# Patient Record
Sex: FEMALE | Race: WHITE
[De-identification: names, ages, dates, MRNs, and addresses within clinical notes are randomized per-mention and may not be internally consistent; named-entity substitution may affect disease eponyms.]

---

## 2020-01-03 ENCOUNTER — HOSPITAL ENCOUNTER (EMERGENCY)
Dept: HOSPITAL 35 - ER | Age: 70
Discharge: LEFT BEFORE DISCHARGE | End: 2020-01-03
Payer: COMMERCIAL

## 2020-01-03 VITALS — BODY MASS INDEX: 26.81 KG/M2 | WEIGHT: 181 LBS | HEIGHT: 69 IN

## 2020-01-03 VITALS — DIASTOLIC BLOOD PRESSURE: 51 MMHG | SYSTOLIC BLOOD PRESSURE: 117 MMHG

## 2020-01-03 DIAGNOSIS — Z88.6: ICD-10-CM

## 2020-01-03 DIAGNOSIS — Z87.891: ICD-10-CM

## 2020-01-03 DIAGNOSIS — E11.9: ICD-10-CM

## 2020-01-03 DIAGNOSIS — R07.9: ICD-10-CM

## 2020-01-03 DIAGNOSIS — I10: Primary | ICD-10-CM

## 2020-01-03 DIAGNOSIS — E78.00: ICD-10-CM

## 2020-01-03 LAB
ALBUMIN SERPL-MCNC: 4.1 G/DL (ref 3.4–5)
ALT SERPL-CCNC: 27 U/L (ref 30–65)
ANION GAP SERPL CALC-SCNC: 8 MMOL/L (ref 7–16)
APTT BLD: 27.8 SECONDS (ref 24.5–32.8)
AST SERPL-CCNC: 20 U/L (ref 15–37)
BASOPHILS NFR BLD AUTO: 1.3 % (ref 0–2)
BILIRUB DIRECT SERPL-MCNC: 0.2 MG/DL
BILIRUB SERPL-MCNC: 0.5 MG/DL
BUN SERPL-MCNC: 17 MG/DL (ref 7–18)
CALCIUM SERPL-MCNC: 9.9 MG/DL (ref 8.5–10.1)
CHLORIDE SERPL-SCNC: 104 MMOL/L (ref 98–107)
CO2 SERPL-SCNC: 29 MMOL/L (ref 21–32)
CREAT SERPL-MCNC: 0.9 MG/DL (ref 0.6–1)
EOSINOPHIL NFR BLD: 2.5 % (ref 0–3)
ERYTHROCYTE [DISTWIDTH] IN BLOOD BY AUTOMATED COUNT: 13.3 % (ref 10.5–14.5)
GLUCOSE SERPL-MCNC: 82 MG/DL (ref 74–106)
GRANULOCYTES NFR BLD MANUAL: 56 % (ref 36–66)
HCT VFR BLD CALC: 42.5 % (ref 37–47)
HGB BLD-MCNC: 14.1 GM/DL (ref 12–15)
INR PPP: 1.1
LYMPHOCYTES NFR BLD AUTO: 32.9 % (ref 24–44)
MAGNESIUM SERPL-MCNC: 2.3 MG/DL (ref 1.8–2.4)
MCH RBC QN AUTO: 31 PG (ref 26–34)
MCHC RBC AUTO-ENTMCNC: 33.1 G/DL (ref 28–37)
MCV RBC: 93.8 FL (ref 80–100)
MONOCYTES NFR BLD: 7.3 % (ref 1–8)
NEUTROPHILS # BLD: 5.5 THOU/UL (ref 1.4–8.2)
PLATELET # BLD: 295 THOU/UL (ref 150–400)
POTASSIUM SERPL-SCNC: 3.8 MMOL/L (ref 3.5–5.1)
PROT SERPL-MCNC: 7.7 G/DL (ref 6.4–8.2)
PROTHROMBIN TIME: 11.1 SECONDS (ref 9.3–11.4)
RBC # BLD AUTO: 4.54 MIL/UL (ref 4.2–5)
SODIUM SERPL-SCNC: 141 MMOL/L (ref 136–145)
TROPONIN I SERPL-MCNC: <0.06 NG/ML (ref ?–0.06)
WBC # BLD AUTO: 9.8 THOU/UL (ref 4–11)

## 2020-01-04 NOTE — EKG
[place] [address] [facility] [employer]
[place]  [address]
Phone:  [phone]                    ELECTROCARDIOGRAM REPORT      
_______________________________________________________________________________
 
Name:       TILA ERICKSON              Room #:                     [facility]#:      [id number]     Account #:      [id number]  
Admission:  20    Attend Phys:                          
Discharge:  20    Date of Birth:  [birth year]  
                                                          Report #: [id number]
   [id number]
_______________________________________________________________________________
THIS REPORT FOR:   //name//                          
 
                         [facility] ED
                                       
Test Date:    2020               Test Time:    17:18:06
Pat Name:     TILA ERICKSON           Department:   
Patient ID:   [id number]            Room:          
Gender:       F                        Technician:   BLADIMIR
:          1950               Requested By: Surendra Frances
Order Number: [id number] MD:   Cooper Rosenthal
                                 Measurements
Intervals                              Axis          
Rate:         70                       P:            52
LA:           181                      QRS:          60
QRSD:         102                      T:            21
QT:           422                                    
QTc:          456                                    
                           Interpretive Statements
Sinus rhythm
Nonspecific T wave abnormality
Compared to ECG 2013 10:48:08
No significant change was found
Electronically Signed On 2020 11:51:24 CST by Cooper Rosenthal
[email or web address]
 
 
 
 
 
 
 
 
 
 
 
 
 
 
 
 
 
 
 
  <ELECTRONICALLY SIGNED>
   By: Cooper Rosenthal MD, [facility]   
  20     1151
D: 20 1718                           _____________________________________
T: 20 1718                           Cooper Rosenthal MD, FACC     /EPI

## 2021-12-02 ENCOUNTER — HOSPITAL ENCOUNTER (OUTPATIENT)
Dept: HOSPITAL 96 - M.CL | Age: 71
Discharge: HOME | End: 2021-12-02
Attending: INTERNAL MEDICINE
Payer: COMMERCIAL

## 2021-12-02 VITALS — SYSTOLIC BLOOD PRESSURE: 131 MMHG | DIASTOLIC BLOOD PRESSURE: 93 MMHG

## 2021-12-02 VITALS — SYSTOLIC BLOOD PRESSURE: 134 MMHG | DIASTOLIC BLOOD PRESSURE: 101 MMHG

## 2021-12-02 VITALS — DIASTOLIC BLOOD PRESSURE: 56 MMHG | SYSTOLIC BLOOD PRESSURE: 148 MMHG

## 2021-12-02 VITALS — DIASTOLIC BLOOD PRESSURE: 85 MMHG | SYSTOLIC BLOOD PRESSURE: 126 MMHG

## 2021-12-02 VITALS — SYSTOLIC BLOOD PRESSURE: 127 MMHG | DIASTOLIC BLOOD PRESSURE: 49 MMHG

## 2021-12-02 VITALS — SYSTOLIC BLOOD PRESSURE: 111 MMHG | DIASTOLIC BLOOD PRESSURE: 71 MMHG

## 2021-12-02 VITALS — DIASTOLIC BLOOD PRESSURE: 60 MMHG | SYSTOLIC BLOOD PRESSURE: 137 MMHG

## 2021-12-02 VITALS — WEIGHT: 181.37 LBS | HEIGHT: 69 IN | BODY MASS INDEX: 26.86 KG/M2

## 2021-12-02 VITALS — DIASTOLIC BLOOD PRESSURE: 52 MMHG | SYSTOLIC BLOOD PRESSURE: 128 MMHG

## 2021-12-02 DIAGNOSIS — I25.10: ICD-10-CM

## 2021-12-02 DIAGNOSIS — M19.90: ICD-10-CM

## 2021-12-02 DIAGNOSIS — I25.82: ICD-10-CM

## 2021-12-02 DIAGNOSIS — Z88.8: ICD-10-CM

## 2021-12-02 DIAGNOSIS — Z98.890: ICD-10-CM

## 2021-12-02 DIAGNOSIS — E11.9: ICD-10-CM

## 2021-12-02 DIAGNOSIS — Z79.899: ICD-10-CM

## 2021-12-02 DIAGNOSIS — R07.9: Primary | ICD-10-CM

## 2021-12-02 DIAGNOSIS — E78.5: ICD-10-CM

## 2021-12-02 LAB
ALBUMIN SERPL-MCNC: 3.7 G/DL (ref 3.4–5)
ALP SERPL-CCNC: 71 U/L (ref 46–116)
ALT SERPL-CCNC: 35 U/L (ref 30–65)
ANION GAP SERPL CALC-SCNC: 6 MMOL/L (ref 7–16)
APTT BLD: 26.6 SECONDS (ref 25–31.3)
AST SERPL-CCNC: 21 U/L (ref 15–37)
BILIRUB SERPL-MCNC: 0.4 MG/DL
BUN SERPL-MCNC: 14 MG/DL (ref 7–18)
CALCIUM SERPL-MCNC: 9.4 MG/DL (ref 8.5–10.1)
CHLORIDE SERPL-SCNC: 102 MMOL/L (ref 98–107)
CHOLEST SERPL-MCNC: 216 MG/DL (ref ?–200)
CO2 SERPL-SCNC: 31 MMOL/L (ref 21–32)
CREAT SERPL-MCNC: 0.7 MG/DL (ref 0.6–1.3)
GLUCOSE SERPL-MCNC: 108 MG/DL (ref 70–99)
HCT VFR BLD CALC: 38.7 % (ref 37–47)
HDLC SERPL-MCNC: 41 MG/DL (ref 40–?)
HGB BLD-MCNC: 13.2 GM/DL (ref 12–15)
INR PPP: 1
LDLC SERPL-MCNC: 130 MG/DL (ref ?–100)
MCH RBC QN AUTO: 31.1 PG (ref 26–34)
MCHC RBC AUTO-ENTMCNC: 34.1 G/DL (ref 28–37)
MCV RBC: 91.3 FL (ref 80–100)
MPV: 8.6 FL. (ref 7.2–11.1)
PLATELET COUNT*: 253 THOU/UL (ref 150–400)
POTASSIUM SERPL-SCNC: 4.1 MMOL/L (ref 3.5–5.1)
PROT SERPL-MCNC: 7.4 G/DL (ref 6.4–8.2)
PROTHROMBIN TIME: 10.3 SECONDS (ref 9.2–11.5)
RBC # BLD AUTO: 4.24 MIL/UL (ref 4.2–5)
RDW-CV: 13.8 % (ref 10.5–14.5)
SODIUM SERPL-SCNC: 139 MMOL/L (ref 136–145)
TC:HDL: 5.3 RATIO
TRIGL SERPL-MCNC: 225 MG/DL (ref ?–150)
VLDLC SERPL CALC-MCNC: 45 MG/DL (ref ?–40)
WBC # BLD AUTO: 9.9 THOU/UL (ref 4–11)

## 2021-12-02 NOTE — CARD
[address]  [address]                    CARDIAC CATH REPORT           
_______________________________________________________________________________
 
Name:       TILA ERICKSON               Room:                      REG CLI 
M.R.#:  [id number]      Account #:      [id number]  
Admission:  12/02/21     Attend Phys:    Emre Mcdonald MD, 
Discharge:               Date of Birth:  [birth year]  
         Report #: [id number]
                                                                     [id number]
_______________________________________________________________________________
THIS REPORT FOR:  
 
cc:  Surendra Riley MD, Michael D. MD [place], Michael J. MD [facility]                                         ~
 
 
--------------- APPROVED REPORT --------------
 
 
Study performed:  12/02/2021 11:53:01
 
Patient Details
Patient Status: Out-Patient                  Room #: 
The patient is a 71 year-old female
 
Event Personnel
Surendra Easley  Cardiologist, Toma Melton RN RN, Tona Cueva RN 
Monitor, Jacobo Segal RTR Scrub
 
Procedures Performed
Art Access - R femoral artery*  Left Heart Cath w/or w/o Coronaries 
[id number] [facility] Hemostasis w/ Angioseal
 
Admission/Lab Medications/Medications given during 
procedure
Lidocaine Subcut 10 ml, Midazolam (Versed) IV 1 mg, Fentanyl IV 25 
mcg
 
Procedure Narrative
The patient was brought electively to the Cardiac Catheterization 
Laboratory and was prepped and draped in a sterile manner. The right 
femoral was infiltrated with 2% Lidocaine subcutaneous anesthesia. IV 
conscious sedation was used throughout procedure with appropriate 
monitoring and was performed in the presence of a registered nurse 
who was an independent trained observer other than the physician 
performing the procedure. A Waterflow 6 FR sheath was inserted into 
the right femoral artery. Coronary angiography was performed using 
coronary diagnostic catheters. The right coronary system was accessed 
and visualized with a Diagnostic JR4 6Fr catheter. The left coronary 
system was accessed and visualized with a Diagnostic JL4 6Fr 
catheter. The left ventricle was accessed and visualized with a 
Diagnostic Pigtail 6Fr catheter. Left ventricular/Aortic Valve 
gradient assessed via catheter pullback. Left ventriculogram was 
performed in [place] projection. Pre-demployment femoral angiogram was 
performed . Closure device was deployed with a 6 Fr Mynx. The patient 
 
 
 
[place] [address]                    CARDIAC CATH REPORT           
_______________________________________________________________________________
 
Name:       TILA ERICKSON               Room:                      REG CLI 
M.R.#:  [id number]      Account #:      [id number]  
Admission:  12/02/21     Attend Phys:    Emre Mcdonald MD, 
Discharge:               Date of Birth:  [birth year]  
         Report #: [id number]
                                                                     [id number]
_______________________________________________________________________________
 
tolerated the procedure well and there were no complications 
associated with the procedure. There was no hematoma.
 
Intraoperative Conscious Sedation
Sedation start time:  1221           Case end Time:  
1236    
Fentanyl  25 mcg    Versed  1 mg  
 
Fluoro Time:    1.8 minutes    
Dose:     DAP 32470 cGycm2  798 mGy  
Contrast Type and Amount:  Omnipaque 90 mL    
 
Diagnostic Cath
Left Main The left main coronary artery is normal and bifurcates into 
a left anterior descending and circumflex coronary artery.
LAD  The left anterior descending coronary artery is mildly, 10%, 
plaque proximally and mildly, 40%, plaque in the midportion.  The 
distal vessel appears free of significant disease.
Diagonal 1 Small in caliber and free of significant disease.
Diagonal 2 Small in caliber and free of significant disease.
Diagonal 3 Small in caliber and free of significant 
disease.
Circumflex The circumflex coronary artery is minimally 10% plaque 
proximally.  The vessel terminates in a very large branching obtuse 
marginal branch.  Distally the circumflex gives off extensive 
collaterals to the distal right coronary artery, PDA and posterior 
lateral LV branch.
OM1  The first obtuse marginal branch is a large branch that appears 
free of significant disease.
Right Coronary The right coronary artery is totally occluded 
proximally with faint right to right collaterals.
R PDA  The PDA is very faintly visualized by left-to-right 
collaterals.
RPLV  A large posterior lateral LV branch appears to fill by left to 
left collaterals.
 
Left Ventriculography
The left ventricle is normal in size with Reduced contractility. The 
left ventricular ejection fraction is estimated to be 40-45%. Left 
ventricular wall motion abnormalities are present. There is a region 
of akinesis involving the basal to midportion of the inferior 
wall.
 
Hemodynamics
The aortic pressure is 144/69 mmHg with a mean of 85 mmHg. The left 
 
 
 
[place] [address]                    CARDIAC CATH REPORT           
_______________________________________________________________________________
 
Name:       TILA ERICKSON               Room:                      REG CLI 
M.RFelipe#:  [id number]      Account #:      [id number]  
Admission:  12/02/21     Attend Phys:    Emre Mcdonald MD, 
Discharge:               Date of Birth:  [birth year]  
         Report #: [id number]
                                                                     [id number]
_______________________________________________________________________________
 
ventricular pressure is 132/2 mmHg with a mean of mmHg. The left 
ventricular end diastolic pressure is 16 mmHg. 
 
Conclusion
1.  Chronically occluded right coronary artery that appears to be a 
dominant vessel that is filled primarily by left-to-right 
collaterals.
2.  Minimal nonocclusive disease involving the LAD and circumflex.
3.  Mild left ventricular systolic dysfunction with wall motion 
abnormalities as outlined above.
4.  Minimally elevated left ventricular end-diastolic pressure. 
 
Recommendations
1.  Continue aggressive medical management and risk factor 
modification.
 
 
 
 
 
 
 
 
 
 
 
 
 
 
 
 
 
 
 
 
 
 
 
 
 
 
 
 
<ELECTRONICALLY SIGNED>
                                        By:  Surendra Easley MD, FACC   
12/02/21 1828
D: 12/02/21 1828_______________________________________
T: 12/02/21 1828Micshruthi Easley MD, FACC      /INF

## 2021-12-02 NOTE — EKG
[place] [address]
Phone:  [phone]                     ELECTROCARDIOGRAM REPORT      
_______________________________________________________________________________
 
Name:         VAHETILA SMITH S              Room:                     REG CLI
M.R.#:    [id number]     Account #:     [id number]  
Admission:    21    Attend Phys:   MYKEL Cook
Discharge:                Date of Birth: [birth year]  
Date of Service: 21 1047  Report #:      [id number]
        [id number]
_______________________________________________________________________________
THIS REPORT FOR:  //name//                      
 
                          [facility]
                                       
Test Date:    2021               Test Time:    10:47:26
Pat Name:     TILA ERICKSON           Department:   
Patient ID:   [id number]            Room:          
Gender:       F                        Technician:   
:          1950               Requested By: Emre Mcdonald
Order Number: [id number]    Andrea MD:   Emre Mcdonald
                                 Measurements
Intervals                              Axis          
Rate:         67                       P:            32
MO:           160                      QRS:          57
QRSD:         99                       T:            37
QT:           419                                    
QTc:          443                                    
                           Interpretive Statements
Sinus rhythm
Inferior infarct, old possible
No previous ECG available for comparison
Electronically Signed On 2021 11:14:10 CST by Emre Mcdonald
[email or web address]
 
 
 
 
 
 
 
 
 
 
 
 
 
 
 
 
 
 
 
 
 
  <ELECTRONICALLY SIGNED>
                                           By: Emre Mcdonald MD, [facility]     
  21     1114
D: 21 1047   _____________________________________
T: 21 1047   Emre Mcdonald MD, [facility]       /EPI